# Patient Record
(demographics unavailable — no encounter records)

---

## 2025-03-26 NOTE — HISTORY OF PRESENT ILLNESS
[de-identified] : 8yo M here for evaluation of enteritis   Had ear infection many years ago and took antibiotics  Since then "abnormal stooling pattern" Initially with diarrhea stooling twice daily, [place] 5-6, resolved after 2 years Now with normal stooling pattern for last 4-5 years  New onset abdominal pain started one week ago RLQ, unable to describe  Went to PCP 4 days ago, referred to ED for possible appendicitis  RLQ US negative, but consistent with possible enteritis  Abdominal pain resolved 3 days ago and feels completely well today  Stooling pattern, once daily, [place] 4, no blood in stool  No nausea or vomiting   No fever, oral ulcers, joint pains, rash

## 2025-03-26 NOTE — PHYSICAL EXAM
[Well Developed] : well developed [Well Nourished] : well nourished [NAD] : in no acute distress [PERRL] : pupils were equal, round, reactive to light  [icteric] : anicteric [Moist & Pink Mucous Membranes] : moist and pink mucous membranes [CTAB] : lungs clear to auscultation bilaterally [Respiratory Distress] : no respiratory distress  [Regular Rate and Rhythm] : regular rate and rhythm [Normal S1, S2] : normal S1 and S2 [Soft] : soft  [Distended] : non distended [Tender] : non tender [Normal Bowel Sounds] : normal bowel sounds [No HSM] : no hepatosplenomegaly appreciated [Normal Tone] : normal tone [Well-Perfused] : well-perfused [Edema] : no edema [Cyanosis] : no cyanosis [Rash] : no rash [Jaundice] : no jaundice [Interactive] : interactive [de-identified] : no perianal lesions

## 2025-03-26 NOTE — ASSESSMENT
[Educated Patient & Family about Diagnosis] : educated the patient and family about the diagnosis [FreeTextEntry1] : 10yo M with no medical history presents for evaluation of enteritis visualized on RLQ US.  DDx is broad and includes infectious, inflammatory or immune mediated. Given acute onset and rapid resolution of symptoms, more likely infectious. Plan for GI PCR. Consider labs and calprotectin in 6-8 weeks.

## 2025-03-26 NOTE — CONSULT LETTER
[Dear  ___] : Dear  [unfilled], [Consult Letter:] : I had the pleasure of evaluating your patient, [unfilled]. [Please see my note below.] : Please see my note below. [Consult Closing:] : Thank you very much for allowing me to participate in the care of this patient.  If you have any questions, please do not hesitate to contact me. [Sincerely,] : Sincerely, [FreeTextEntry3] : Lilia Earl MD Attending Physician, Pediatric Gastroenterology [facility]

## 2025-03-26 NOTE — CONSULT LETTER
[Dear  ___] : Dear  [unfilled], [Consult Letter:] : I had the pleasure of evaluating your patient, [unfilled]. [Please see my note below.] : Please see my note below. [Consult Closing:] : Thank you very much for allowing me to participate in the care of this patient.  If you have any questions, please do not hesitate to contact me. [Sincerely,] : Sincerely, [FreeTextEntry3] : Lilia Earl MD Attending Physician, Pediatric Gastroenterology [facility]

## 2025-03-26 NOTE — HISTORY OF PRESENT ILLNESS
[de-identified] : 8yo M here for evaluation of enteritis   Had ear infection many years ago and took antibiotics  Since then "abnormal stooling pattern" Initially with diarrhea stooling twice daily, [place] 5-6, resolved after 2 years Now with normal stooling pattern for last 4-5 years  New onset abdominal pain started one week ago RLQ, unable to describe  Went to PCP 4 days ago, referred to ED for possible appendicitis  RLQ US negative, but consistent with possible enteritis  Abdominal pain resolved 3 days ago and feels completely well today  Stooling pattern, once daily, [place] 4, no blood in stool  No nausea or vomiting   No fever, oral ulcers, joint pains, rash

## 2025-03-26 NOTE — ASSESSMENT
[Educated Patient & Family about Diagnosis] : educated the patient and family about the diagnosis [FreeTextEntry1] : 8yo M with no medical history presents for evaluation of enteritis visualized on RLQ US.  DDx is broad and includes infectious, inflammatory or immune mediated. Given acute onset and rapid resolution of symptoms, more likely infectious. Plan for GI PCR. Consider labs and calprotectin in 6-8 weeks.

## 2025-03-26 NOTE — PHYSICAL EXAM
[Well Developed] : well developed [Well Nourished] : well nourished [NAD] : in no acute distress [PERRL] : pupils were equal, round, reactive to light  [icteric] : anicteric [Moist & Pink Mucous Membranes] : moist and pink mucous membranes [CTAB] : lungs clear to auscultation bilaterally [Respiratory Distress] : no respiratory distress  [Regular Rate and Rhythm] : regular rate and rhythm [Normal S1, S2] : normal S1 and S2 [Soft] : soft  [Distended] : non distended [Tender] : non tender [Normal Bowel Sounds] : normal bowel sounds [No HSM] : no hepatosplenomegaly appreciated [Normal Tone] : normal tone [Well-Perfused] : well-perfused [Edema] : no edema [Cyanosis] : no cyanosis [Rash] : no rash [Jaundice] : no jaundice [Interactive] : interactive [de-identified] : no perianal lesions